# Patient Record
Sex: FEMALE | Race: OTHER | HISPANIC OR LATINO | URBAN - METROPOLITAN AREA
[De-identification: names, ages, dates, MRNs, and addresses within clinical notes are randomized per-mention and may not be internally consistent; named-entity substitution may affect disease eponyms.]

---

## 2019-12-26 ENCOUNTER — EMERGENCY (EMERGENCY)
Facility: HOSPITAL | Age: 39
LOS: 1 days | Discharge: HOME | End: 2019-12-26
Attending: EMERGENCY MEDICINE | Admitting: EMERGENCY MEDICINE
Payer: COMMERCIAL

## 2019-12-26 VITALS
TEMPERATURE: 99 F | HEART RATE: 61 BPM | DIASTOLIC BLOOD PRESSURE: 83 MMHG | RESPIRATION RATE: 16 BRPM | SYSTOLIC BLOOD PRESSURE: 144 MMHG | OXYGEN SATURATION: 100 %

## 2019-12-26 VITALS
RESPIRATION RATE: 18 BRPM | TEMPERATURE: 99 F | SYSTOLIC BLOOD PRESSURE: 147 MMHG | HEART RATE: 65 BPM | OXYGEN SATURATION: 100 % | DIASTOLIC BLOOD PRESSURE: 87 MMHG

## 2019-12-26 LAB
ALBUMIN SERPL ELPH-MCNC: 3.7 G/DL — SIGNIFICANT CHANGE UP (ref 3.3–5)
ALBUMIN SERPL ELPH-MCNC: 3.9 G/DL — SIGNIFICANT CHANGE UP (ref 3.3–5)
ALP SERPL-CCNC: 77 U/L — SIGNIFICANT CHANGE UP (ref 40–120)
ALP SERPL-CCNC: 77 U/L — SIGNIFICANT CHANGE UP (ref 40–120)
ALT FLD-CCNC: 10 U/L — SIGNIFICANT CHANGE UP (ref 4–33)
ALT FLD-CCNC: 11 U/L — SIGNIFICANT CHANGE UP (ref 4–33)
ANION GAP SERPL CALC-SCNC: 10 MMO/L — SIGNIFICANT CHANGE UP (ref 7–14)
ANION GAP SERPL CALC-SCNC: 14 MMO/L — SIGNIFICANT CHANGE UP (ref 7–14)
APPEARANCE UR: CLEAR — SIGNIFICANT CHANGE UP
AST SERPL-CCNC: 12 U/L — SIGNIFICANT CHANGE UP (ref 4–32)
AST SERPL-CCNC: 13 U/L — SIGNIFICANT CHANGE UP (ref 4–32)
BASOPHILS # BLD AUTO: 0.02 K/UL — SIGNIFICANT CHANGE UP (ref 0–0.2)
BASOPHILS NFR BLD AUTO: 0.4 % — SIGNIFICANT CHANGE UP (ref 0–2)
BILIRUB SERPL-MCNC: 0.2 MG/DL — SIGNIFICANT CHANGE UP (ref 0.2–1.2)
BILIRUB SERPL-MCNC: < 0.2 MG/DL — LOW (ref 0.2–1.2)
BILIRUB UR-MCNC: NEGATIVE — SIGNIFICANT CHANGE UP
BLD GP AB SCN SERPL QL: NEGATIVE — SIGNIFICANT CHANGE UP
BLOOD UR QL VISUAL: NEGATIVE — SIGNIFICANT CHANGE UP
BUN SERPL-MCNC: 14 MG/DL — SIGNIFICANT CHANGE UP (ref 7–23)
BUN SERPL-MCNC: 15 MG/DL — SIGNIFICANT CHANGE UP (ref 7–23)
CALCIUM SERPL-MCNC: 8.5 MG/DL — SIGNIFICANT CHANGE UP (ref 8.4–10.5)
CALCIUM SERPL-MCNC: 9 MG/DL — SIGNIFICANT CHANGE UP (ref 8.4–10.5)
CHLORIDE SERPL-SCNC: 102 MMOL/L — SIGNIFICANT CHANGE UP (ref 98–107)
CHLORIDE SERPL-SCNC: 102 MMOL/L — SIGNIFICANT CHANGE UP (ref 98–107)
CO2 SERPL-SCNC: 21 MMOL/L — LOW (ref 22–31)
CO2 SERPL-SCNC: 25 MMOL/L — SIGNIFICANT CHANGE UP (ref 22–31)
COLOR SPEC: SIGNIFICANT CHANGE UP
CREAT SERPL-MCNC: 0.49 MG/DL — LOW (ref 0.5–1.3)
CREAT SERPL-MCNC: 0.49 MG/DL — LOW (ref 0.5–1.3)
EOSINOPHIL # BLD AUTO: 0.07 K/UL — SIGNIFICANT CHANGE UP (ref 0–0.5)
EOSINOPHIL NFR BLD AUTO: 1.3 % — SIGNIFICANT CHANGE UP (ref 0–6)
GLUCOSE SERPL-MCNC: 87 MG/DL — SIGNIFICANT CHANGE UP (ref 70–99)
GLUCOSE SERPL-MCNC: 97 MG/DL — SIGNIFICANT CHANGE UP (ref 70–99)
GLUCOSE UR-MCNC: NEGATIVE — SIGNIFICANT CHANGE UP
HCG SERPL-ACNC: < 5 MIU/ML — SIGNIFICANT CHANGE UP
HCG SERPL-ACNC: < 5 MIU/ML — SIGNIFICANT CHANGE UP
HCT VFR BLD CALC: 36.5 % — SIGNIFICANT CHANGE UP (ref 34.5–45)
HGB BLD-MCNC: 11.1 G/DL — LOW (ref 11.5–15.5)
IMM GRANULOCYTES NFR BLD AUTO: 0.2 % — SIGNIFICANT CHANGE UP (ref 0–1.5)
KETONES UR-MCNC: NEGATIVE — SIGNIFICANT CHANGE UP
LEUKOCYTE ESTERASE UR-ACNC: NEGATIVE — SIGNIFICANT CHANGE UP
LIDOCAIN IGE QN: 16.1 U/L — SIGNIFICANT CHANGE UP (ref 7–60)
LYMPHOCYTES # BLD AUTO: 1.81 K/UL — SIGNIFICANT CHANGE UP (ref 1–3.3)
LYMPHOCYTES # BLD AUTO: 32.5 % — SIGNIFICANT CHANGE UP (ref 13–44)
MCHC RBC-ENTMCNC: 23.7 PG — LOW (ref 27–34)
MCHC RBC-ENTMCNC: 30.4 % — LOW (ref 32–36)
MCV RBC AUTO: 78 FL — LOW (ref 80–100)
MONOCYTES # BLD AUTO: 0.38 K/UL — SIGNIFICANT CHANGE UP (ref 0–0.9)
MONOCYTES NFR BLD AUTO: 6.8 % — SIGNIFICANT CHANGE UP (ref 2–14)
NEUTROPHILS # BLD AUTO: 3.28 K/UL — SIGNIFICANT CHANGE UP (ref 1.8–7.4)
NEUTROPHILS NFR BLD AUTO: 58.8 % — SIGNIFICANT CHANGE UP (ref 43–77)
NITRITE UR-MCNC: NEGATIVE — SIGNIFICANT CHANGE UP
NRBC # FLD: 0 K/UL — SIGNIFICANT CHANGE UP (ref 0–0)
PH UR: 7.5 — SIGNIFICANT CHANGE UP (ref 5–8)
PLATELET # BLD AUTO: 233 K/UL — SIGNIFICANT CHANGE UP (ref 150–400)
PMV BLD: 11.3 FL — SIGNIFICANT CHANGE UP (ref 7–13)
POTASSIUM SERPL-MCNC: 4 MMOL/L — SIGNIFICANT CHANGE UP (ref 3.5–5.3)
POTASSIUM SERPL-MCNC: 4.1 MMOL/L — SIGNIFICANT CHANGE UP (ref 3.5–5.3)
POTASSIUM SERPL-SCNC: 4 MMOL/L — SIGNIFICANT CHANGE UP (ref 3.5–5.3)
POTASSIUM SERPL-SCNC: 4.1 MMOL/L — SIGNIFICANT CHANGE UP (ref 3.5–5.3)
PROT SERPL-MCNC: 6.6 G/DL — SIGNIFICANT CHANGE UP (ref 6–8.3)
PROT SERPL-MCNC: 7.2 G/DL — SIGNIFICANT CHANGE UP (ref 6–8.3)
PROT UR-MCNC: 10 — SIGNIFICANT CHANGE UP
RBC # BLD: 4.68 M/UL — SIGNIFICANT CHANGE UP (ref 3.8–5.2)
RBC # FLD: 17.6 % — HIGH (ref 10.3–14.5)
RH IG SCN BLD-IMP: POSITIVE — SIGNIFICANT CHANGE UP
RH IG SCN BLD-IMP: POSITIVE — SIGNIFICANT CHANGE UP
SODIUM SERPL-SCNC: 137 MMOL/L — SIGNIFICANT CHANGE UP (ref 135–145)
SODIUM SERPL-SCNC: 137 MMOL/L — SIGNIFICANT CHANGE UP (ref 135–145)
SP GR SPEC: 1.02 — SIGNIFICANT CHANGE UP (ref 1–1.04)
TSH SERPL-MCNC: 1.55 UIU/ML — SIGNIFICANT CHANGE UP (ref 0.27–4.2)
UROBILINOGEN FLD QL: NORMAL — SIGNIFICANT CHANGE UP
WBC # BLD: 5.57 K/UL — SIGNIFICANT CHANGE UP (ref 3.8–10.5)
WBC # FLD AUTO: 5.57 K/UL — SIGNIFICANT CHANGE UP (ref 3.8–10.5)

## 2019-12-26 PROCEDURE — 99285 EMERGENCY DEPT VISIT HI MDM: CPT

## 2019-12-26 PROCEDURE — 74177 CT ABD & PELVIS W/CONTRAST: CPT | Mod: 26

## 2019-12-26 RX ORDER — MORPHINE SULFATE 50 MG/1
4 CAPSULE, EXTENDED RELEASE ORAL ONCE
Refills: 0 | Status: DISCONTINUED | OUTPATIENT
Start: 2019-12-26 | End: 2019-12-26

## 2019-12-26 RX ADMIN — MORPHINE SULFATE 4 MILLIGRAM(S): 50 CAPSULE, EXTENDED RELEASE ORAL at 14:50

## 2019-12-26 RX ADMIN — MORPHINE SULFATE 4 MILLIGRAM(S): 50 CAPSULE, EXTENDED RELEASE ORAL at 15:05

## 2019-12-26 NOTE — ED PROVIDER NOTE - PHYSICAL EXAMINATION
General: Alert and Oriented. No apparent distress.  Head: Normocephalic and atraumatic.  Eyes: PERRLA with EOMI.  Neck: Supple. Trachea midline.   Cardiac: Normal S1 and S2 w/ RRR. No murmurs appreciated.   Pulmonary: Vesicular breath sounds bilaterally. No increased WOB. No wheezes or crackles.  Abdominal: Soft, mild LUQ tenderness. +L CVA T  Neurologic: No focal sensory or motor deficits.  Musculoskeletal: Strength appropriate in all 4 extremities for age with no limited ROM.  Skin: Color appropriate for race. Intact, warm, and well-perfused.

## 2019-12-26 NOTE — ED PROVIDER NOTE - CLINICAL SUMMARY MEDICAL DECISION MAKING FREE TEXT BOX
Pt p/w abd pain/flank pain in setting of gastric bypass may be due to kidney stone vs MSK pain vs UTI however will need CT scan to evlaute for possible surgical-related complications; non toxic on exam low suspicion for acute imminently threatening disease. Marco att: Pt p/w abd pain/flank pain in setting of gastric bypass may be due to kidney stone vs MSK pain vs UTI however will need CT scan to evaluate for possible surgical-related complications; non toxic on exam low suspicion for acute imminently threatening disease.

## 2019-12-26 NOTE — ED PROVIDER NOTE - OBJECTIVE STATEMENT
38 yo F hx gastric bypass 2y ago, c/o LUQ abdominal pain and left sided flank pain. Pain  started 2d ago, was just driving no clear inciting event, 5/10, intermittent. Unsure of paliating or provoking factors. Last BM yesterday; has been passing flatus today, BM and gas have been normal for her.    Denies falls/trauma, n/v, fever, hematuria, dysuria. LMP 10/20, hx tubal ligation.

## 2019-12-26 NOTE — ED PROVIDER NOTE - NSFOLLOWUPINSTRUCTIONS_ED_ALL_ED_FT
Please call or see your doctor or return to the ER if you have new chest pain, shortness of breath, fevers, inability to eat or drink, or worsening of symptoms that brought you to the emergency room today.    Take ibuprofen as needed for pain.     Please follow up with your primary care physician in 1-2 days or as soon as possible. Please call or see your doctor or return to the ER if you have new chest pain, shortness of breath, fevers, inability to eat or drink, or worsening of symptoms that brought you to the emergency room today.    Take ibuprofen as needed for pain. If you have continued abdominal pain please follow up with gastroenterology, phone number above.    Please follow up with your primary care physician in 1-2 days or as soon as possible.

## 2019-12-26 NOTE — ED PROVIDER NOTE - PATIENT PORTAL LINK FT
You can access the FollowMyHealth Patient Portal offered by [facility] by registering at the following website: [email or web address]. By joining [employer]’s FollowMyHealth portal, you will also be able to view your health information using other applications (apps) compatible with our system.

## 2019-12-26 NOTE — ED PROVIDER NOTE - PROGRESS NOTE DETAILS
Pt reports pain is improved; labs and CT unremarkable, this was endorsed to the patient who stated she feels comfortable following up with her PMD. Return precautions discussed w/ pt

## 2019-12-26 NOTE — ED ADULT NURSE NOTE - CAS EDN DISCHARGE INTERVENTIONS
1- Monitor Blood pressure closely.  2- Blood pressure control.  3- BP. meds as per cardiology and primary care team. IV discontinued, cath removed intact/by MD

## 2019-12-26 NOTE — ED ADULT TRIAGE NOTE - CHIEF COMPLAINT QUOTE
Pt with co abdominal pain x 2 days . Pt denies n/v/d. Pt states she is passing gas last BM yesterday. Pt looks pale in triage, HX of anemia and gastric by pass 2 years ago.

## 2019-12-26 NOTE — ED ADULT NURSE NOTE - OBJECTIVE STATEMENT
Pt presenting to room 15 AxOx4 ambulatory at baseline with c/o left sided lower quadrant and left sided flank pain x2 days. PMH of tubal ligation. Pt states she was sent here after going to [facility] MD this AM- sent here for CT. On arrival to ED pt's breathing is even and unlabored, pt satting well on RA. Pt denies CP, SOB, N/V, burning or pain with urination, fevers, chills. Abdomen nondistended. IV established with 20g in LAC, labs drawn and sent, MD at bedside, VSS and as noted, will continue to monitor.

## 2019-12-27 LAB
BACTERIA UR CULT: SIGNIFICANT CHANGE UP
SPECIMEN SOURCE: SIGNIFICANT CHANGE UP

## 2020-02-11 ENCOUNTER — EMERGENCY (EMERGENCY)
Facility: HOSPITAL | Age: 40
LOS: 1 days | Discharge: HOME | End: 2020-02-11
Attending: EMERGENCY MEDICINE | Admitting: STUDENT IN AN ORGANIZED HEALTH CARE EDUCATION/TRAINING PROGRAM
Payer: COMMERCIAL

## 2020-02-11 VITALS
SYSTOLIC BLOOD PRESSURE: 137 MMHG | OXYGEN SATURATION: 98 % | RESPIRATION RATE: 16 BRPM | HEART RATE: 96 BPM | DIASTOLIC BLOOD PRESSURE: 80 MMHG | TEMPERATURE: 99 F

## 2020-02-11 VITALS
RESPIRATION RATE: 16 BRPM | DIASTOLIC BLOOD PRESSURE: 84 MMHG | TEMPERATURE: 98 F | SYSTOLIC BLOOD PRESSURE: 161 MMHG | OXYGEN SATURATION: 97 % | HEART RATE: 92 BPM

## 2020-02-11 LAB
ALBUMIN SERPL ELPH-MCNC: 3.9 G/DL — SIGNIFICANT CHANGE UP (ref 3.3–5)
ALP SERPL-CCNC: 88 U/L — SIGNIFICANT CHANGE UP (ref 40–120)
ALT FLD-CCNC: 16 U/L — SIGNIFICANT CHANGE UP (ref 4–33)
ANION GAP SERPL CALC-SCNC: 12 MMO/L — SIGNIFICANT CHANGE UP (ref 7–14)
AST SERPL-CCNC: 16 U/L — SIGNIFICANT CHANGE UP (ref 4–32)
BASOPHILS # BLD AUTO: 0.02 K/UL — SIGNIFICANT CHANGE UP (ref 0–0.2)
BASOPHILS NFR BLD AUTO: 0.4 % — SIGNIFICANT CHANGE UP (ref 0–2)
BILIRUB SERPL-MCNC: < 0.2 MG/DL — LOW (ref 0.2–1.2)
BUN SERPL-MCNC: 10 MG/DL — SIGNIFICANT CHANGE UP (ref 7–23)
CALCIUM SERPL-MCNC: 8.7 MG/DL — SIGNIFICANT CHANGE UP (ref 8.4–10.5)
CHLORIDE SERPL-SCNC: 102 MMOL/L — SIGNIFICANT CHANGE UP (ref 98–107)
CO2 SERPL-SCNC: 23 MMOL/L — SIGNIFICANT CHANGE UP (ref 22–31)
CREAT SERPL-MCNC: 0.45 MG/DL — LOW (ref 0.5–1.3)
EOSINOPHIL # BLD AUTO: 0.03 K/UL — SIGNIFICANT CHANGE UP (ref 0–0.5)
EOSINOPHIL NFR BLD AUTO: 0.5 % — SIGNIFICANT CHANGE UP (ref 0–6)
GLUCOSE SERPL-MCNC: 176 MG/DL — HIGH (ref 70–99)
HCT VFR BLD CALC: 36 % — SIGNIFICANT CHANGE UP (ref 34.5–45)
HGB BLD-MCNC: 11 G/DL — LOW (ref 11.5–15.5)
IMM GRANULOCYTES NFR BLD AUTO: 0.7 % — SIGNIFICANT CHANGE UP (ref 0–1.5)
LYMPHOCYTES # BLD AUTO: 0.64 K/UL — LOW (ref 1–3.3)
LYMPHOCYTES # BLD AUTO: 11.5 % — LOW (ref 13–44)
MCHC RBC-ENTMCNC: 25.3 PG — LOW (ref 27–34)
MCHC RBC-ENTMCNC: 30.6 % — LOW (ref 32–36)
MCV RBC AUTO: 82.9 FL — SIGNIFICANT CHANGE UP (ref 80–100)
MONOCYTES # BLD AUTO: 0.37 K/UL — SIGNIFICANT CHANGE UP (ref 0–0.9)
MONOCYTES NFR BLD AUTO: 6.6 % — SIGNIFICANT CHANGE UP (ref 2–14)
NEUTROPHILS # BLD AUTO: 4.47 K/UL — SIGNIFICANT CHANGE UP (ref 1.8–7.4)
NEUTROPHILS NFR BLD AUTO: 80.3 % — HIGH (ref 43–77)
NRBC # FLD: 0 K/UL — SIGNIFICANT CHANGE UP (ref 0–0)
PLATELET # BLD AUTO: 202 K/UL — SIGNIFICANT CHANGE UP (ref 150–400)
PMV BLD: 11.2 FL — SIGNIFICANT CHANGE UP (ref 7–13)
POTASSIUM SERPL-MCNC: 3.7 MMOL/L — SIGNIFICANT CHANGE UP (ref 3.5–5.3)
POTASSIUM SERPL-SCNC: 3.7 MMOL/L — SIGNIFICANT CHANGE UP (ref 3.5–5.3)
PROT SERPL-MCNC: 7.3 G/DL — SIGNIFICANT CHANGE UP (ref 6–8.3)
RBC # BLD: 4.34 M/UL — SIGNIFICANT CHANGE UP (ref 3.8–5.2)
RBC # FLD: 17 % — HIGH (ref 10.3–14.5)
SODIUM SERPL-SCNC: 137 MMOL/L — SIGNIFICANT CHANGE UP (ref 135–145)
WBC # BLD: 5.57 K/UL — SIGNIFICANT CHANGE UP (ref 3.8–10.5)
WBC # FLD AUTO: 5.57 K/UL — SIGNIFICANT CHANGE UP (ref 3.8–10.5)

## 2020-02-11 PROCEDURE — 71046 X-RAY EXAM CHEST 2 VIEWS: CPT | Mod: 26

## 2020-02-11 PROCEDURE — 99283 EMERGENCY DEPT VISIT LOW MDM: CPT

## 2020-02-11 RX ORDER — ALBUTEROL 90 UG/1
3 AEROSOL, METERED ORAL
Qty: 60 | Refills: 0
Start: 2020-02-11 | End: 2020-03-11

## 2020-02-11 RX ORDER — ACETAMINOPHEN 500 MG
650 TABLET ORAL ONCE
Refills: 0 | Status: COMPLETED | OUTPATIENT
Start: 2020-02-11 | End: 2020-02-11

## 2020-02-11 RX ADMIN — Medication 650 MILLIGRAM(S): at 09:12

## 2020-02-11 RX ADMIN — Medication 50 MILLIGRAM(S): at 09:12

## 2020-02-11 NOTE — ED PROVIDER NOTE - PHYSICAL EXAMINATION
GEN: no acute respiratory distress. nontoxic, speaking comfortably in full sentences, ambulating with steady gait.  HEENT: NCAT. face symmetrical. PERRL 4mm, EOMI, nose midline and without discharge,  MMM, oropharynx wnl.  Neck: no JVD, trachea midline, no LAD, FROM, no stiffness  CV: RRR. +S1S2, no murmur. 2+ pulses in 4 extremities  Chest: CTA B/l. no wheezing, rales, rhonchi. no retractions. good air movement. no tenderness. no rash or ecchymosis  ABD: +BS, soft, non distended, non tender. No guarding/rebound.   MSK: No clubbing, cyanosis, edema. FROM of all extremities. no tenderness to palpation. No midline or paraspinal tenderness.  Neuro: AAOX3. Sensation intact, motor 5/5 throughout.  SKIN: No erythema, lesions or rash

## 2020-02-11 NOTE — ED PROVIDER NOTE - CLINICAL SUMMARY MEDICAL DECISION MAKING FREE TEXT BOX
fever, chills, wheezing, cough. + sick contact. suspect flu-like illness. xr neg. labs without significant abnormality. glucose elevated will follow up with pcp. Return precautions were discussed with patient at bedside and patient expressed understanding. stable for dc. Return precautions were discussed with patient at bedside and patient expressed understanding.

## 2020-02-11 NOTE — ED PROVIDER NOTE - RAPID ASSESSMENT
40 y/o F with PMhx of asthma presents to ED c/o SOB, Yancey, CP, cough, and congestion. Pt was seen by Slade yesterday and told it was a cough. Pt notes it has gotten worse since yesterday. Pt has been taking Motrin 800mg,  last dose was 3hrs ago. Pt has sick contact at home. .Pt denies abd pain. 40 y/o F with PMhx of asthma presents to ED c/o SOB, Yancey, CP, cough, and congestion. Pt was seen by Slade yesterday and told it was a cough. Pt notes it has gotten worse since yesterday. Pt has been taking Motrin 800mg,  last dose was 3hrs ago. Pt has sick contact at home. .Pt denies abdominal pain.

## 2020-02-11 NOTE — ED ADULT NURSE NOTE - OBJECTIVE STATEMENT
Intake RN: Patient is a 40 y/o F reporting fevers/chills, generalized myalgias, dry cough and head ache that began Sunday.  Respirations unlabored, however patient reported seeking care as she began to develop shortness of breath.  20 gauge intravenous line placed in right forearm.

## 2020-02-11 NOTE — ED PROVIDER NOTE - OBJECTIVE STATEMENT
40 yo F past medical history intermittent asthma, hypertension presents for 3 days of shortness of breathe, cough without sputum, wheezing, tactile fever, chills for past 3 days. no recent travel or illness. +sick contacts-kids. no ha, neck pain, abdominal pain, urinary complaints.

## 2020-02-11 NOTE — ED PROVIDER NOTE - NSFOLLOWUPINSTRUCTIONS_ED_ALL_ED_FT
1) Please follow-up with your primary care doctor within the next 3-5 days. Please call today for an appointment.   2) If you have any worsening of symptoms or any other concerns please return to the ED immediately.  3) Please take the medication you were prescribed today and continue taking your home medications as directed.  4) You may have been given a copy of your labs and/or imaging.  Please go over these with your primary care doctor.

## 2020-02-11 NOTE — ED ADULT TRIAGE NOTE - CHIEF COMPLAINT QUOTE
alert oriented  c/o subjective chills fever fatigue cough chest pain  headcahe body aches  started 2 days ago  cough became much worse last night   hx HTN

## 2020-02-11 NOTE — ED PROVIDER NOTE - PATIENT PORTAL LINK FT
You can access the FollowMyHealth Patient Portal offered by [facility] by registering at the following website: [email or web address]. By joining [employer]’s FollowMyHealth portal, you will also be able to view your health information using other applications (apps) compatible with our system.

## 2020-02-11 NOTE — ED PROVIDER NOTE - NS ED ROS FT
Constitutional: + fever. + chills.   Eyes: No visual changes, eye pain or redness  HEENT: No throat pain, hearing changes, ear pain, nasal pain. No nose bleeding.  CV: No chest pain, no palpitations  Resp: + shortness of breath, + cough  GI: No abdominal pain. No nausea. no  vomiting. No diarrhea. No constipation.   : No dysuria, hematuria. no urinary frequency, no urinary urgency.  MSK: No musculoskeletal pain  Skin: No rash, lesions, no bruises  Neuro: No headache. No numbness or tingling. No weakness. No dizziness.  Heme: no easy bruising.  Allergy/Immunology: no allergy to medicine or food. Constitutional: + fever. + chills.   Eyes: No visual changes, eye pain or redness  HEENT: No throat pain, hearing changes, ear pain, nasal pain. No nose bleeding.  CV: No chest pain, no palpitations  Resp: + shortness of breath, + cough  GI: No abdominal pain. No nausea. no  vomiting. No diarrhea. No constipation.   : No dysuria, hematuria. no urinary frequency, no urinary urgency.  MSK: No musculoskeletal pain  Skin: No rash, lesions, no bruises  Neuro: No headache. No numbness or tingling. No weakness. No dizziness.  Allergy/Immunology: allergy to ambien and augmentin

## 2021-02-15 NOTE — ED ADULT NURSE NOTE - NSSUHOSCREENINGYN_ED_ALL_ED
Yes - the patient is able to be screened
[place] to call system/Call bell, personal items and telephone within reach/Instruct patient to call for assistance/Room bathroom lighting operational/Non-slip footwear when patient is off stretcher/Physically safe environment: no spills, clutter or unnecessary equipment/Stretcher in lowest position, wheels locked, appropriate side rails in place/Monitor gait and stability/Monitor for mental status changes and reorient to person, place, and time/Review medications for side effects contributing to fall risk/Reinforce activity limits and safety measures with patient and family

## 2021-07-26 ENCOUNTER — TELEPHONE (OUTPATIENT)
Dept: NEUROLOGY | Facility: CLINIC | Age: 41
End: 2021-07-26

## 2021-07-26 NOTE — TELEPHONE ENCOUNTER
Best contact number for patient:  [phone]  Emergency Contact name and number:  None  Referring provider and telephone number:  Referral is being sent by patient's dr  Primary Care Provider Name and if affiliated with [facility]:   Dr Heriberto Brewster No   Reason for Appointment/Dx:  Memory issues  Have you seen and followed up with a pediatric Neurologist for this disease in the past?      No (If yes ok to schedule with Dr Leora Krause)    Neurology Location patient would like to be seen:    Order received? No                                                 Records Received? No  If no, explain: records are being sent    Have you ever seen another Neurologist?       No    Insurance [address] [place] [address]     ID/Policy #:[id number]    Secondary Insurance:    ID/Policy#: Workman's Comp/ Accident/ School  Information      Workman's Comp/Accident/School related?        No    If yes name of Insurance company:    Claim #:    Date of Injury:    Type of Injury:     Name and Telephone Number:    Notes:                   Appointment date:   12/10/2021

## 2021-12-06 ENCOUNTER — TELEPHONE (OUTPATIENT)
Dept: NEUROLOGY | Facility: CLINIC | Age: 41
End: 2021-12-06

## 2021-12-10 ENCOUNTER — CONSULT (OUTPATIENT)
Dept: NEUROLOGY | Facility: CLINIC | Age: 41
End: 2021-12-10
Payer: COMMERCIAL

## 2021-12-10 VITALS
HEART RATE: 68 BPM | BODY MASS INDEX: 37.98 KG/M2 | DIASTOLIC BLOOD PRESSURE: 70 MMHG | HEIGHT: 67 IN | TEMPERATURE: 97.7 F | SYSTOLIC BLOOD PRESSURE: 126 MMHG | WEIGHT: 242 LBS

## 2021-12-10 DIAGNOSIS — R41.3 AMNESIA/MEMORY DISORDER: ICD-10-CM

## 2021-12-10 PROCEDURE — 99244 OFF/OP CNSLTJ NEW/EST MOD 40: CPT | Performed by: PSYCHIATRY & NEUROLOGY

## 2021-12-10 RX ORDER — LOSARTAN POTASSIUM 100 MG/1
100 TABLET ORAL DAILY
COMMUNITY
Start: 2021-07-30 | End: 2022-07-30

## 2021-12-10 RX ORDER — ATORVASTATIN CALCIUM 20 MG/1
TABLET, FILM COATED ORAL
COMMUNITY
Start: 2021-10-04 | End: 2022-05-18 | Stop reason: SDUPTHER

## 2021-12-10 RX ORDER — FERROUS SULFATE 325(65) MG
325 TABLET ORAL
COMMUNITY

## 2021-12-10 RX ORDER — ALBUTEROL SULFATE 90 UG/1
2 AEROSOL, METERED RESPIRATORY (INHALATION) EVERY 6 HOURS PRN
COMMUNITY
Start: 2021-07-06 | End: 2022-07-06

## 2021-12-10 RX ORDER — IBUPROFEN 800 MG/1
TABLET ORAL
COMMUNITY
Start: 2021-11-04

## 2021-12-10 RX ORDER — ATORVASTATIN CALCIUM 20 MG/1
20 TABLET, FILM COATED ORAL DAILY
COMMUNITY
Start: 2021-07-13 | End: 2022-07-13

## 2021-12-21 ENCOUNTER — APPOINTMENT (OUTPATIENT)
Dept: LAB | Facility: CLINIC | Age: 41
End: 2021-12-21
Payer: COMMERCIAL

## 2021-12-21 DIAGNOSIS — R41.3 AMNESIA/MEMORY DISORDER: ICD-10-CM

## 2021-12-21 PROCEDURE — 86618 LYME DISEASE ANTIBODY: CPT

## 2021-12-21 PROCEDURE — 36415 COLL VENOUS BLD VENIPUNCTURE: CPT

## 2021-12-21 PROCEDURE — 86592 SYPHILIS TEST NON-TREP QUAL: CPT

## 2021-12-22 LAB
B BURGDOR IGG+IGM SER-ACNC: 26
RPR SER QL: NORMAL

## 2022-01-06 ENCOUNTER — HOSPITAL ENCOUNTER (OUTPATIENT)
Dept: NEUROLOGY | Facility: HOSPITAL | Age: 42
Discharge: HOME | End: 2022-01-06
Attending: PSYCHIATRY & NEUROLOGY
Payer: COMMERCIAL

## 2022-01-06 DIAGNOSIS — R41.3 AMNESIA/MEMORY DISORDER: ICD-10-CM

## 2022-01-06 PROCEDURE — 95819 EEG AWAKE AND ASLEEP: CPT | Performed by: PSYCHIATRY & NEUROLOGY

## 2022-01-06 PROCEDURE — 95816 EEG AWAKE AND DROWSY: CPT

## 2022-01-27 ENCOUNTER — HOSPITAL ENCOUNTER (OUTPATIENT)
Dept: MRI IMAGING | Facility: CLINIC | Age: 42
Discharge: HOME | End: 2022-01-27
Attending: PSYCHIATRY & NEUROLOGY
Payer: COMMERCIAL

## 2022-01-27 DIAGNOSIS — R41.3 AMNESIA/MEMORY DISORDER: ICD-10-CM

## 2022-01-27 PROCEDURE — G1004 CDSM NDSC: HCPCS

## 2022-01-27 PROCEDURE — 70551 MRI BRAIN STEM W/O DYE: CPT

## 2022-02-01 ENCOUNTER — TELEPHONE (OUTPATIENT)
Dept: NEUROLOGY | Facility: CLINIC | Age: 42
End: 2022-02-01

## 2022-02-01 NOTE — TELEPHONE ENCOUNTER
Spoke with patient in detail regarding results of brain MRI which demonstrated a small arachnoid cyst in left middle fossa area which would be asymptomatic and most likely congenital in nature  The nonspecific white matter changes are most likely reflection of her underlying diabetes, hypertension and high cholesterol  Her EEG was normal   Will order neuropsych testing as she continues to have concerns about her memory    She also reports she has been getting headaches and will plan to discuss this further at the time of her follow-up appointment

## 2022-02-03 DIAGNOSIS — R41.3 AMNESIA/MEMORY DISORDER: ICD-10-CM

## 2022-02-03 DIAGNOSIS — F41.9 ANXIETY DISORDER: Primary | ICD-10-CM

## 2022-02-04 ENCOUNTER — TELEPHONE (OUTPATIENT)
Dept: NEUROLOGY | Facility: CLINIC | Age: 42
End: 2022-02-04

## 2022-02-04 NOTE — TELEPHONE ENCOUNTER
Called patient to let her know we do not participate with insurance plan   Letter generated and mailed to patient as well as message sent to referring provider

## 2022-02-07 ENCOUNTER — APPOINTMENT (OUTPATIENT)
Dept: LAB | Facility: CLINIC | Age: 42
End: 2022-02-07
Payer: COMMERCIAL

## 2022-02-07 DIAGNOSIS — B35.1 MYCOTIC TOENAILS: ICD-10-CM

## 2022-02-07 LAB
ALBUMIN SERPL BCP-MCNC: 3.9 G/DL (ref 3.5–5)
ALP SERPL-CCNC: 88 U/L (ref 46–116)
ALT SERPL W P-5'-P-CCNC: 36 U/L (ref 12–78)
AST SERPL W P-5'-P-CCNC: 17 U/L (ref 5–45)
BILIRUB DIRECT SERPL-MCNC: 0.06 MG/DL (ref 0–0.2)
BILIRUB SERPL-MCNC: 0.4 MG/DL (ref 0.2–1)
PROT SERPL-MCNC: 7.5 G/DL (ref 6.4–8.2)

## 2022-02-07 PROCEDURE — 36415 COLL VENOUS BLD VENIPUNCTURE: CPT

## 2022-02-07 PROCEDURE — 80076 HEPATIC FUNCTION PANEL: CPT

## 2022-04-19 DIAGNOSIS — R41.3 AMNESIA/MEMORY DISORDER: Primary | ICD-10-CM

## 2022-05-03 ENCOUNTER — TELEPHONE (OUTPATIENT)
Dept: NEUROLOGY | Facility: CLINIC | Age: 42
End: 2022-05-03

## 2022-05-03 DIAGNOSIS — R41.3 AMNESIA/MEMORY DISORDER: Primary | ICD-10-CM

## 2022-05-03 NOTE — TELEPHONE ENCOUNTER
pt called and states that she needs an order for out pt ST, for cognitive remediation  we originally sent order for neuropsychology which is incorrect      ST order can be faxed to [id number] at [phone]

## 2022-05-05 ENCOUNTER — TELEPHONE (OUTPATIENT)
Dept: NEUROLOGY | Facility: CLINIC | Age: 42
End: 2022-05-05

## 2022-05-05 NOTE — TELEPHONE ENCOUNTER
Called [facility] and spoke to marylin landa    She states that she received script that was faxed today and nothing else is needed from our office at this time

## 2022-05-05 NOTE — TELEPHONE ENCOUNTER
[facility] called spoke with Alvaro Desouza she was looking for script for this Patient  I sent the fax of the order to [phone] she was asking how the doctor wanted the patient to be treated   I am not able to answer that question/  I told  her that I would forward the information and the contact number [phone]

## 2022-05-11 ENCOUNTER — TELEPHONE (OUTPATIENT)
Dept: NEUROLOGY | Facility: CLINIC | Age: 42
End: 2022-05-11

## 2022-05-18 ENCOUNTER — OFFICE VISIT (OUTPATIENT)
Dept: NEUROLOGY | Facility: CLINIC | Age: 42
End: 2022-05-18
Payer: COMMERCIAL

## 2022-05-18 VITALS
HEART RATE: 66 BPM | HEIGHT: 67 IN | WEIGHT: 238 LBS | BODY MASS INDEX: 37.35 KG/M2 | SYSTOLIC BLOOD PRESSURE: 132 MMHG | DIASTOLIC BLOOD PRESSURE: 80 MMHG | RESPIRATION RATE: 16 BRPM

## 2022-05-18 DIAGNOSIS — R41.3 AMNESIA/MEMORY DISORDER: Primary | ICD-10-CM

## 2022-05-18 DIAGNOSIS — F43.10 PTSD (POST-TRAUMATIC STRESS DISORDER): ICD-10-CM

## 2022-05-18 DIAGNOSIS — F41.9 ANXIETY DISORDER: ICD-10-CM

## 2022-05-18 PROCEDURE — 99214 OFFICE O/P EST MOD 30 MIN: CPT | Performed by: PSYCHIATRY & NEUROLOGY

## 2022-05-18 RX ORDER — SERTRALINE HYDROCHLORIDE 100 MG/1
100 TABLET, FILM COATED ORAL DAILY
COMMUNITY
Start: 2022-04-01
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
## 2022-09-21 ENCOUNTER — TELEPHONE (OUTPATIENT)
Dept: NEUROLOGY | Facility: CLINIC | Age: 42
End: 2022-09-21

## 2022-09-21 NOTE — TELEPHONE ENCOUNTER
Pt left VM,state that she has been having worsening headache,asking for some medication to help  Stated that her next OV is on 2/13/23 and she is on waiting list   Requesting a call back at [phone]    Called pt back to get more information at [phone],KNT got message to call back lather with no option to leave a message  Called home # [phone] listed on communication consent and left message that we been trying to reach her to get more information about her headaches,requesting a call back

## 2022-09-21 NOTE — TELEPHONE ENCOUNTER
Patient called to see if she can get a sooner appointment then Feb 2023 because her migraines are getting worst and unbearable  I advised her I will reach out to Dr Stuart Flores MA to see what I can do and call her back and she said ok

## 2022-09-21 NOTE — TELEPHONE ENCOUNTER
[12:34 PM] Neftaly Wood can i offer this patient today at 3 pm with Papi Friday      [1:01 PM] Alfornia Fleischer  yes sorry! was with a patient      [1:01 PM] Brandi Casas  No worries, thank you!         __________________________________    Genet Lindsay patient and received no answer and not able to leave a message  I sent her a [employer] message to call back to schedule for the 3 pm today with Dr Turpin Friday

## 2022-09-22 NOTE — TELEPHONE ENCOUNTER
Called pt again to get more information about her headaches  Pt explains that this is the same tape of HA that she was experience when she saw dr Sony Sue in 5/18/22  Stated that she has been having daily headaches  Same days gets worse than others,  Stated that has  been going on for a couple of months  Location/Description: throbbing pain, on her R side going to her neck  Pain scale: 5  Associated symptoms:nausea at times, photophobia, vertigo at times  Precipitating factors: light,loud noise     Alleviating factors: staying in the dark and quiet place    What medications have you tried for this  headache?  Excedrin OTC is effective some time, Tylenol,Motrin  Educated pt not to take any OTC pain medication more than 3 times weekly due to rebound HA,she verbalized understanding  Current medication:   Pristig 50 mg po daily  Stratera 25 mg po daily  Valium 2 mg tab 2 tabs po as need it  ,she usually takes it about 1-2 times weekly  Prednisone 20 mg tab takes 3 tabs po daily for 5 days,started on 9/19  Diclofenac 75 mg 1 tab po BID-prescribed by PCP for her back started on 9/7  Losartan 100 mg po daily  Metforman 500 mg po bid   Lipitor 20 mg po every evening  Ferrous Sulfate 325 po daily    Pt went to urgent care this past Monday  Because of back pain,she was given shot of Toradol and script for Prednisone for 5 days  Still having back pain at this time 9/10,stated she has Ortho appt -10/17    Stated that HA didn't got better since started Prednisone      Best [phone],[phone] to leave a detailed message   Please advise

## 2022-09-22 NOTE — TELEPHONE ENCOUNTER
Patient was seen on 2 occasions in the past, the bursts recent in May  Both of these visits were for complaints of memory difficulty  No complaints of headache was voiced at those appointments    Please put her on a move up list

## 2022-11-03 ENCOUNTER — TELEPHONE (OUTPATIENT)
Dept: NEUROLOGY | Facility: CLINIC | Age: 42
End: 2022-11-03

## 2022-11-03 NOTE — TELEPHONE ENCOUNTER
Received fax from [address] [place] [address] containing PT Outpatient Certification/Summary that is requiring Physician Signature

## 2022-11-22 ENCOUNTER — OFFICE VISIT (OUTPATIENT)
Dept: DERMATOLOGY | Facility: CLINIC | Age: 42
End: 2022-11-22

## 2022-11-22 VITALS — WEIGHT: 238 LBS | HEIGHT: 67 IN | BODY MASS INDEX: 37.35 KG/M2 | TEMPERATURE: 97.6 F

## 2022-11-22 DIAGNOSIS — L65.8: Primary | ICD-10-CM

## 2022-11-22 DIAGNOSIS — L21.9 SEBORRHEIC DERMATITIS: ICD-10-CM

## 2022-11-22 DIAGNOSIS — L65.9 HAIR LOSS: ICD-10-CM

## 2022-11-22 RX ORDER — METHOCARBAMOL 750 MG/1
TABLET, FILM COATED ORAL
COMMUNITY
Start: 2022-09-22

## 2022-11-22 RX ORDER — DESVENLAFAXINE 100 MG/1
TABLET, EXTENDED RELEASE ORAL
COMMUNITY
Start: 2022-09-01

## 2022-11-22 RX ORDER — METHYLPHENIDATE HYDROCHLORIDE 5 MG/1
5 TABLET ORAL 2 TIMES DAILY
COMMUNITY
Start: 2022-11-04

## 2022-11-22 RX ORDER — HYDROCHLOROTHIAZIDE 25 MG/1
25 TABLET ORAL DAILY
COMMUNITY
Start: 2022-10-04 | End: 2023-10-14

## 2022-11-22 RX ORDER — DIAZEPAM 2 MG/1
1 TABLET ORAL DAILY PRN
COMMUNITY
Start: 2022-09-07

## 2022-11-22 RX ORDER — DESVENLAFAXINE 50 MG/1
50 TABLET, EXTENDED RELEASE ORAL EVERY MORNING
COMMUNITY
Start: 2022-11-15

## 2022-11-22 RX ORDER — DICLOFENAC SODIUM 75 MG/1
75 TABLET, DELAYED RELEASE ORAL 2 TIMES DAILY WITH MEALS
COMMUNITY
Start: 2022-09-07

## 2022-11-22 RX ORDER — ALPRAZOLAM 0.25 MG/1
1 TABLET ORAL 2 TIMES DAILY PRN
COMMUNITY
Start: 2022-10-04

## 2022-11-22 RX ORDER — DOCUSATE SODIUM 100 MG/1
100 CAPSULE, LIQUID FILLED ORAL 2 TIMES DAILY
COMMUNITY
Start: 2022-11-08

## 2022-11-22 RX ORDER — ALBUTEROL SULFATE 90 UG/1
2 AEROSOL, METERED RESPIRATORY (INHALATION) EVERY 6 HOURS PRN
COMMUNITY
Start: 2022-05-23 | End: 2023-05-23

## 2022-11-22 RX ORDER — BUSPIRONE HYDROCHLORIDE 10 MG/1
TABLET ORAL
COMMUNITY
Start: 2022-11-16

## 2022-11-22 RX ORDER — MELOXICAM 15 MG/1
15 TABLET ORAL DAILY
COMMUNITY
Start: 2022-09-01 | End: 2023-10-14

## 2022-11-22 RX ORDER — IRON,CARBONYL/ASCORBIC ACID 100-250 MG
TABLET ORAL
COMMUNITY
Start: 2015-11-21

## 2022-11-22 NOTE — PATIENT INSTRUCTIONS
1  ALOPECIA- COMBINATION OF TRACTION ALOPECIA, CHRONIC TELOGEN EFFLUVIUM, ANDROGENETIC ALOPECIA      Assessment and Plan:  Based on a thorough discussion of this condition and the management approach to it (including a comprehensive discussion of the known risks, side effects and potential benefits of treatment), the patient (family) agrees to implement the following specific plan:  Ordered Blood work to rule out autoimmune disease or vitamin deficiency: WILLIAN, TSH, T3/T4, testosterone,  Discussed start over the counter Minoxidil (5 %) Apply topically twice a day to scalp  For 6 months   Counseled that this may cause hair loss upon starting medication leading to another telogen effluvium  Discussed treatment options : oral , injectable, and hair transplant  Consider oral minoxidil in future if topical minoxidil is not effective  Can also consider oral finasteride  At this time, oral medications may not be favorable due to extensive medication list portending possible drug interaction  Because hair loss doesn't appear to be scarring at this time, biopsy would not be high yield         How do we treat  alopecia? The main goal of treatment is to slow or stop the progression of hair loss rather than promote hair regrowth  Results are variable and it is not possible to predict who may benefit from treatment  Options for treatment include:   2% minoxidil solution  This medication is applied with a dropper onto dry scale twice daily  Hands should be washed thoroughly to avoid inadvertent application to other parts of the body  It is a pregnancy category C drug and is not recommended for children younger than 25years of age  Side effects include excessive hair growth, especially above the eyebrows and over the cheeks  It usually disappears after a year even with continued use of minoxidil  Tretinoin (Retin-A) applied to the scalp as an adjunct to minoxidil has shown some benefits    Women  Estrogen therapy such as birth control pills that contain the least amount of progestin (Ortho-Cyclen, Ortho Tri-Cyclen, Ovcon 35, Mircette, Demulen, Zovia)  Spironolactone (Aldactone) which is a weak inhibitor of androgen binding to androgen receptors  This is an off-label use of the drug  Men  Finasteride (Proscar) which inhibits the production of dihydrotestosterone  Other options to improve cosmetic appearance can include wearing wigs and hair transplantation  2 SEBORRHEIC DERMATITIS        Assessment and Plan:  Based on a thorough discussion of this condition and the management approach to it (including a comprehensive discussion of the known risks, side effects and potential benefits of treatment), the patient (family) agrees to implement the following specific plan:  No treatment at this moment      Seborrheic Dermatitis   Seborrheic dermatitis is a common, chronic or relapsing form of eczema/dermatitis that mainly affects the sebaceous, gland-rich regions of the scalp, face, and trunk  There are infantile and adult forms of seborrhoeic dermatitis  It is sometimes associated with psoriasis and, in that clinical scenario, may be referred to as "sebo-psoriasis "  Seborrheic dermatitis is also known as "seborrheic eczema "  Dandruff (also called "pityriasis capitis") is an uninflamed form of seborrhoeic dermatitis  Dandruff presents as bran-like scaly patches scattered within hair-bearing areas of the scalp  In an infant, this condition may be referred to as "cradle cap "  The cause of seborrheic dermatitis is not completely understood  It is associated with proliferation of various species of the skin commensal Malassezia, in its yeast (non-pathogenic) form  Its metabolites (such as the fatty acids oleic acid, malssezin, and indole-3-carbaldehyde) may cause an inflammatory reaction  Differences in skin barrier lipid content and function may account for individual presentations      Infantile Seborrheic Dermatitis  Infantile seborrheic dermatitis affects babies under the age of 1 months and usually resolves by 1012 months of age  Infantile seborrheic dermatitis causes "cradle cap" (diffuse, greasy scaling on scalp)  The rash may spread to affect armpit and groin folds (a type of "napkin dermatitis")  There may be associated salmon-pink colored patches that may flake or peel  The rash in this case is usually not especially itchy, so the baby often appears undisturbed by the rash, even when more generalized  Adult Seborrheic Dermatitis  Adult seborrheic dermatitis tends to begin in late adolescence; prevalence is greatest in young adults and in the elderly  It is more common in males than in females  The following factors are sometimes associated with severe adult seborrheic dermatitis:  Oily skin  Familial tendency to seborrhoeic dermatitis or a family history of psoriasis  Immunosuppression: organ transplant recipient, human immunodeficiency virus (HIV) infection and patients with lymphoma  Neurological and psychiatric diseases: Parkinson disease, tardive dyskinesia, depression, epilepsy, facial nerve palsy, spinal cord injury and congenital disorders such as Down syndrome  Treatment for psoriasis with psoralen and ultraviolet A (PUVA) therapy  Lack of sleep  Stressful events  In adults, seborrheic dermatitis may typically affect the scalp, face (creases around the nose, behind ears, within eyebrows) and upper trunk  Typical clinical features include:   Winter flares, improving in summer following sun exposure  Minimal itch most of the time  Combination oily and dry mid-facial skin  Ill-defined localized scaly patches or diffuse scale in the scalp  Blepharitis; scaly red eyelid margins  [place]-pink, thin, scaly, and ill-defined plaques in skin folds on both sides of the face  Petal or ring-shaped flaky patches on hair-line and on anterior chest  Rash in armpits, under the breasts, in the groin folds and genital creases  Superficial folliculitis (inflamed hair follicles) on cheeks and upper trunk    Seborrheic dermatitis is diagnosed by its clinical appearance and behavior  Skin biopsy may be helpful but is rarely necessary to make this diagnosis

## 2022-11-22 NOTE — PROGRESS NOTES
[facility] Note     Patient Name: Shravan Burgess  Encounter Date: 11 22 2022    Have you been cared for by a [place] [id number] Dermatologist in the last 3 years and, if so, which description applies to you? NO  I am considered a "new" patient and must complete all patient intake questions  I am FEMALE/of child-bearing potential     REVIEW OF SYSTEMS:  Have you recently had or currently have any of the following? · Recent fever or chills? No  · Any non-healing wound? No  · Are you pregnant or planning to become pregnant? No  · Are you currently or planning to be nursing or breast feeding? No   PAST MEDICAL HISTORY:  Have you personally ever had or currently have any of the following? If "YES," then please provide more detail  · Skin cancer (such as Melanoma, Basal Cell Carcinoma, Squamous Cell Carcinoma? No  · Tuberculosis, HIV/AIDS, Hepatitis B or C: No  · Systemic Immunosuppression such as Diabetes, Biologic or Immunotherapy, Chemotherapy, Organ Transplantation, Bone Marrow Transplantation YES, diabetes   · Radiation Treatment No   FAMILY HISTORY:  Any "first degree relatives" (parent, brother, sister, or child) with the following? • Skin Cancer, Pancreatic or Other Cancer? No   PATIENT EXPERIENCE:    • Do you want the Dermatologist to perform a COMPLETE skin exam today including a clinical examination under the "bra and underwear" areas? NO  • If necessary, do we have your permission to call and leave a detailed message on your Preferred Phone number that includes your specific medical information?   Yes      Allergies   Allergen Reactions   • Amoxicillin-Pot Clavulanate Itching   • Hydromorphone Abdominal Pain     Red rash ,itchy   • Zolpidem Itching   • Whey - Food Allergy Hives and Rash      Current Outpatient Medications:   •  atorvastatin (LIPITOR) 20 mg tablet, Take 20 mg by mouth daily, Disp: , Rfl:   •  ferrous sulfate 325 (65 Fe) mg tablet, Take 325 mg by mouth daily with breakfast, Disp: , Rfl:   •  ibuprofen (MOTRIN) 800 mg tablet, take 1 tablet by mouth twice a day if needed for mild pain, Disp: , Rfl:   •  losartan (COZAAR) 100 MG tablet, Take 100 mg by mouth daily, Disp: , Rfl:   •  metFORMIN (GLUCOPHAGE) 500 mg tablet, Take 500 mg by mouth in the morning and 500 mg in the evening  Take with meals  , Disp: , Rfl:   •  sertraline (ZOLOFT) 100 mg tablet, Take 100 mg by mouth in the morning , Disp: , Rfl:   •  sertraline (ZOLOFT) 50 mg tablet, Take 50 mg by mouth every evening, Disp: , Rfl:           • Whom besides the patient is providing clinical information about today's encounter?   o NO ADDITIONAL HISTORIAN (patient alone provided history)    Physical Exam and Assessment/Plan by Diagnosis:    1  ALOPECIA- COMBINATION OF TRACTION ALOPECIA, CHRONIC TELOGEN EFFLUVIUM, ANDROGENETIC ALOPECIA    Physical Exam:  • Anatomic Location Affected:  Scalp   • Morphological Description:  Patches of nonscarring hair loss   • Pertinent Positives:  • Pertinent Negatives: Additional History of Present Condition: Patient states had very thin hair  For the past 10 years , during  Last summer started to loss hair but got worse in the last 3 months , she has been taking Iron  And  Keranique  from 2020 -2021 during this period hair loss stopped    Admits emotional  stress event 3 months ago  Assessment and Plan:  Based on a thorough discussion of this condition and the management approach to it (including a comprehensive discussion of the known risks, side effects and potential benefits of treatment), the patient (family) agrees to implement the following specific plan:  • Ordered Blood work to rule out autoimmune disease or vitamin deficiency: WILLIAN, TSH, T3/T4, testosterone,  • Discussed start over the counter Minoxidil (5 %) Apply topically twice a day to scalp  For 6 months    Counseled that this may cause hair loss upon starting medication leading to another telogen effluvium  • Discussed treatment options : oral , injectable, and hair transplant  • Consider oral minoxidil in future if topical minoxidil is not effective  Can also consider oral finasteride  At this time, oral medications may not be favorable due to extensive medication list portending possible drug interaction  • Because hair loss doesn't appear to be scarring at this time, biopsy would not be high yield         How do we treat  alopecia? The main goal of treatment is to slow or stop the progression of hair loss rather than promote hair regrowth  Results are variable and it is not possible to predict who may benefit from treatment  Options for treatment include:   o 2% minoxidil solution  This medication is applied with a dropper onto dry scale twice daily  Hands should be washed thoroughly to avoid inadvertent application to other parts of the body  It is a pregnancy category C drug and is not recommended for children younger than 25years of age    o Side effects include excessive hair growth, especially above the eyebrows and over the cheeks  It usually disappears after a year even with continued use of minoxidil  - Tretinoin (Retin-A) applied to the scalp as an adjunct to minoxidil has shown some benefits   - Women  o Estrogen therapy such as birth control pills that contain the least amount of progestin (Ortho-Cyclen, Ortho Tri-Cyclen, Ovcon 35, Mircette, Demulen, Zovia)  o Spironolactone (Aldactone) which is a weak inhibitor of androgen binding to androgen receptors  This is an off-label use of the drug   - Men  o Finasteride (Proscar) which inhibits the production of dihydrotestosterone  Other options to improve cosmetic appearance can include wearing wigs and hair transplantation  2 SEBORRHEIC DERMATITIS    Physical Exam:  • Anatomic Location Affected:  Scalp   • Morphological Description:  Greasy scaly macules and papules   • Pertinent Positives:  • Pertinent Negatives:     Additional History of Present Condition: Assessment and Plan:  Based on a thorough discussion of this condition and the management approach to it (including a comprehensive discussion of the known risks, side effects and potential benefits of treatment), the patient (family) agrees to implement the following specific plan:  • No treatment at this moment      Seborrheic Dermatitis   Seborrheic dermatitis is a common, chronic or relapsing form of eczema/dermatitis that mainly affects the sebaceous, gland-rich regions of the scalp, face, and trunk  There are infantile and adult forms of seborrhoeic dermatitis  It is sometimes associated with psoriasis and, in that clinical scenario, may be referred to as "sebo-psoriasis "  Seborrheic dermatitis is also known as "seborrheic eczema "  Dandruff (also called "pityriasis capitis") is an uninflamed form of seborrhoeic dermatitis  Dandruff presents as bran-like scaly patches scattered within hair-bearing areas of the scalp  In an infant, this condition may be referred to as "cradle cap "  The cause of seborrheic dermatitis is not completely understood  It is associated with proliferation of various species of the skin commensal Malassezia, in its yeast (non-pathogenic) form  Its metabolites (such as the fatty acids oleic acid, malssezin, and indole-3-carbaldehyde) may cause an inflammatory reaction  Differences in skin barrier lipid content and function may account for individual presentations  Infantile Seborrheic Dermatitis  Infantile seborrheic dermatitis affects babies under the age of 1 months and usually resolves by 1012 months of age  Infantile seborrheic dermatitis causes "cradle cap" (diffuse, greasy scaling on scalp)  The rash may spread to affect armpit and groin folds (a type of "napkin dermatitis")  There may be associated salmon-pink colored patches that may flake or peel    The rash in this case is usually not especially itchy, so the baby often appears undisturbed by the rash, even when more generalized  Adult Seborrheic Dermatitis  Adult seborrheic dermatitis tends to begin in late adolescence; prevalence is greatest in young adults and in the elderly  It is more common in males than in females  The following factors are sometimes associated with severe adult seborrheic dermatitis:  • Oily skin  • Familial tendency to seborrhoeic dermatitis or a family history of psoriasis  • Immunosuppression: organ transplant recipient, human immunodeficiency virus (HIV) infection and patients with lymphoma  • Neurological and psychiatric diseases: Parkinson disease, tardive dyskinesia, depression, epilepsy, facial nerve palsy, spinal cord injury and congenital disorders such as Down syndrome  • Treatment for psoriasis with psoralen and ultraviolet A (PUVA) therapy  • Lack of sleep  • Stressful events  In adults, seborrheic dermatitis may typically affect the scalp, face (creases around the nose, behind ears, within eyebrows) and upper trunk  Typical clinical features include:  • Winter flares, improving in summer following sun exposure  • Minimal itch most of the time  • Combination oily and dry mid-facial skin  • Ill-defined localized scaly patches or diffuse scale in the scalp  • Blepharitis; scaly red eyelid margins  • [place]-pink, thin, scaly, and ill-defined plaques in skin folds on both sides of the face  • Petal or ring-shaped flaky patches on hair-line and on anterior chest  • Rash in armpits, under the breasts, in the groin folds and genital creases  • Superficial folliculitis (inflamed hair follicles) on cheeks and upper trunk    Seborrheic dermatitis is diagnosed by its clinical appearance and behavior  Skin biopsy may be helpful but is rarely necessary to make this diagnosis    Scribe Attestation    I,:  Renuka Olguin am acting as a scribe while in the presence of the attending physician :       I,:  Leo Arvizu MD personally performed the services described in this documentation    as scribed in my presence :

## 2022-12-08 ENCOUNTER — APPOINTMENT (OUTPATIENT)
Dept: LAB | Facility: CLINIC | Age: 42
End: 2022-12-08

## 2022-12-20 DIAGNOSIS — L21.9 SEBORRHEIC DERMATITIS: Primary | ICD-10-CM

## 2022-12-20 RX ORDER — KETOCONAZOLE 20 MG/ML
SHAMPOO TOPICAL
Qty: 120 ML | Refills: 3 | Status: SHIPPED | OUTPATIENT
Start: 2022-12-20

## 2023-01-27 ENCOUNTER — TELEPHONE (OUTPATIENT)
Dept: PSYCHIATRY | Facility: CLINIC | Age: 43
End: 2023-01-27

## 2023-02-13 ENCOUNTER — OFFICE VISIT (OUTPATIENT)
Dept: NEUROLOGY | Facility: CLINIC | Age: 43
End: 2023-02-13

## 2023-02-13 VITALS
HEIGHT: 67 IN | SYSTOLIC BLOOD PRESSURE: 112 MMHG | BODY MASS INDEX: 35.79 KG/M2 | HEART RATE: 64 BPM | DIASTOLIC BLOOD PRESSURE: 70 MMHG | WEIGHT: 228 LBS

## 2023-02-13 DIAGNOSIS — M54.2 CERVICALGIA: ICD-10-CM

## 2023-02-13 DIAGNOSIS — R20.2 PARESTHESIA OF BOTH HANDS: Primary | ICD-10-CM

## 2023-02-13 RX ORDER — TOPIRAMATE 25 MG/1
25 TABLET ORAL 2 TIMES DAILY
COMMUNITY
Start: 2023-01-04

## 2023-02-13 NOTE — PROGRESS NOTES
Zahraa Stockton is a 38 y o  female presents today with symptoms of numbness and tingling in her hands    Assessment:  1  Paresthesia of both hands    2  Cervicalgia        Plan:  Physical therapy follow-up 3 months    Discussion:  Josselny Hardin reports 1 month history of numbness and tingling in her hands as well as neck pain radiating to the right arm  No localizing abnormalities are noted on her exam results of an MRI performed a month ago were normal and she also had an EMG of the upper extremities which reportedly was normal   She was started on Topamax but her symptoms of numbness and tingling preceded the medication  She is scheduled to initiate physical therapy for her neck  I will see her back in follow-up in a couple of months      Subjective:    HPI  Josselyn Hardin returns in follow-up today with new problems  She states that for the last months she has had numbness and tingling in her hands  She states it is intermittent and does not seem to be related to position or activity  She states she was told she had some weakness in  strength on the right  She also reports symptoms of neck pain radiating into her right shoulder and occasionally into the right arm  She had an MRI done of her cervical spine a month ago which by report was normal   She also recently had an EMG study done of her upper extremities and this reportedly was normal as well  She was started on Topamax about 2 weeks ago for migraine headaches but her numbness preceded this  She notes no numbness or tingling in other areas  Past Medical History:   Diagnosis Date   • Brain damage 2022   • Hypertension    • PTSD (post-traumatic stress disorder) 2022       Family History:  Family History   Problem Relation Age of Onset   • Hypertension Mother        Past Surgical History:  Past Surgical History:   Procedure Laterality Date   •  SECTION     • GASTRIC BYPASS         Social History:   reports that she has quit smoking  She has never used smokeless tobacco  She reports current alcohol use  Allergies:  Amoxicillin-pot clavulanate, Hydromorphone, Zolpidem, Lisinopril, and Whey - food allergy      Current Outpatient Medications:   •  albuterol (PROVENTIL HFA,VENTOLIN HFA) 90 mcg/act inhaler, Inhale 2 puffs every 6 (six) hours as needed, Disp: , Rfl:   •  ALPRAZolam (XANAX) 0 25 mg tablet, Take 1 tablet by mouth 2 (two) times a day as needed, Disp: , Rfl:   •  atorvastatin (LIPITOR) 20 mg tablet, Take 20 mg by mouth daily, Disp: , Rfl:   •  desvenlafaxine (PRISTIQ) 100 mg 24 hr tablet, , Disp: , Rfl:   •  desvenlafaxine succinate (PRISTIQ) 50 mg 24 hr tablet, Take 50 mg by mouth every morning, Disp: , Rfl:   •  diazepam (VALIUM) 2 mg tablet, Take 1 tablet by mouth daily as needed, Disp: , Rfl:   •  diclofenac (VOLTAREN) 75 mg EC tablet, Take 75 mg by mouth 2 (two) times a day with meals, Disp: , Rfl:   •  docusate sodium (COLACE) 100 mg capsule, Take 100 mg by mouth 2 (two) times a day, Disp: , Rfl:   •  ferrous sulfate 325 (65 Fe) mg tablet, Take 325 mg by mouth daily with breakfast, Disp: , Rfl:   •  hydrochlorothiazide (HYDRODIURIL) 25 mg tablet, Take 25 mg by mouth daily, Disp: , Rfl:   •  ibuprofen (MOTRIN) 800 mg tablet, take 1 tablet by mouth twice a day if needed for mild pain, Disp: , Rfl:   •  Iron-Vitamin C 100-250 MG TABS, , Disp: , Rfl:   •  ketoconazole (NIZORAL) 2 % shampoo, Daily for 2 weeks straight and then on "Mondays, Wednesdays and Fridays":  Lather into scalp and skin on face, neck, chest, and back; leave on for 5 minutes and then rinse off completely  , Disp: 120 mL, Rfl: 3  •  losartan (COZAAR) 100 MG tablet, Take 100 mg by mouth daily, Disp: , Rfl:   •  metFORMIN (GLUCOPHAGE) 500 mg tablet, Take 1,000 mg by mouth 2 (two) times a day with meals, Disp: , Rfl:   •  methocarbamol (ROBAXIN) 750 mg tablet, Take 750 mg by mouth every 6 (six) hours as needed, Disp: , Rfl:   •  Multiple Vitamins-Minerals (MULTIVITAMIN GUMMIES ADULT PO), , Disp: , Rfl:   •  topiramate (TOPAMAX) 25 mg tablet, Take 25 mg by mouth 2 (two) times a day, Disp: , Rfl:   •  busPIRone (BUSPAR) 10 mg tablet, take 1 tablet by mouth twice a day MORNING/AFTERNOON (Patient not taking: Reported on 2/13/2023), Disp: , Rfl:   •  meloxicam (MOBIC) 15 mg tablet, Take 15 mg by mouth daily (Patient not taking: Reported on 2/13/2023), Disp: , Rfl:   •  methylphenidate (RITALIN) 5 mg tablet, Take 5 mg by mouth 2 (two) times a day (Patient not taking: Reported on 2/13/2023), Disp: , Rfl:   •  sertraline (ZOLOFT) 100 mg tablet, Take 100 mg by mouth in the morning  (Patient not taking: Reported on 11/22/2022), Disp: , Rfl:   •  sertraline (ZOLOFT) 50 mg tablet, Take 50 mg by mouth every evening (Patient not taking: Reported on 11/22/2022), Disp: , Rfl:     I have reviewed the past medical, social and family history, current medications, allergies, vitals, review of systems and updated this information as appropriate today     Objective:    Vitals:  Blood pressure 112/70, pulse 64, height 5' 7" (1 702 m), weight 103 kg (228 lb)  Physical Exam    Neurological Exam  GENERAL: Well-developed well-nourished woman in no acute distress  HEENT/NECK: Head is atraumatic normocephalic, neck is supple with good range of motion  CARDIOVASCULAR: No carotid bruit  NEUROLOGIC:  Mental Status: Awake and alert without aphasia  Cranial Nerves: Extraocular movements are full  Visual fields are full  Pupils are 3 mm and reactive  Face is symmetrical to light touch and movements  Soft palate symmetrically tongue is midline  Motor: No drift is noted on arm extension  No focal weakness is noted to motor testing in the distal and proximal upper and lower extremities bilaterally  Bulk and tone are normal   Sensory: Intact to temperature and vibratory sensation in the extremities bilaterally  Cortical functions intact  Coordination: Finger-to-nose testing is performed active    Romberg is negative  Gait is stable  She is able to tandem walk  Reflexes: 1/4 in the biceps triceps brachialis regions trace at the knees and 1 at the ankles  Toes are downgoing  Tinel's is negative over the carpal tunnel region bilaterally      ROS:    Review of Systems   Constitutional: Negative  Negative for appetite change and fever  HENT: Negative  Negative for hearing loss, tinnitus, trouble swallowing and voice change  Eyes: Positive for photophobia  Negative for pain and visual disturbance  Respiratory: Negative  Negative for shortness of breath  Cardiovascular: Negative  Negative for palpitations  Gastrointestinal: Negative  Negative for nausea and vomiting  Endocrine: Negative  Negative for cold intolerance  Genitourinary: Negative  Negative for dysuria, frequency and urgency  Musculoskeletal: Negative  Negative for gait problem, myalgias and neck pain  Skin: Negative  Negative for rash  Allergic/Immunologic: Negative  Neurological: Positive for dizziness, weakness (R side), light-headedness, numbness (R side and both hands) and headaches (decrease in headaches since initiated topamax)  Negative for tremors, seizures, syncope, facial asymmetry and speech difficulty  Hematological: Negative  Does not bruise/bleed easily  Psychiatric/Behavioral: Negative  Negative for confusion, hallucinations and sleep disturbance

## 2023-02-21 ENCOUNTER — TELEPHONE (OUTPATIENT)
Dept: NEUROLOGY | Facility: CLINIC | Age: 43
End: 2023-02-21

## 2023-05-04 ENCOUNTER — TELEPHONE (OUTPATIENT)
Dept: NEUROLOGY | Facility: CLINIC | Age: 43
End: 2023-05-04

## 2023-05-24 ENCOUNTER — APPOINTMENT (OUTPATIENT)
Dept: LAB | Facility: CLINIC | Age: 43
End: 2023-05-24

## 2023-05-24 DIAGNOSIS — E11.9 TYPE 2 DIABETES MELLITUS WITHOUT COMPLICATION, WITHOUT LONG-TERM CURRENT USE OF INSULIN (HCC): ICD-10-CM

## 2023-05-24 LAB
ALBUMIN SERPL BCP-MCNC: 3.8 G/DL (ref 3.5–5)
ALP SERPL-CCNC: 64 U/L (ref 46–116)
ALT SERPL W P-5'-P-CCNC: 27 U/L (ref 12–78)
ANION GAP SERPL CALCULATED.3IONS-SCNC: 3 MMOL/L (ref 4–13)
AST SERPL W P-5'-P-CCNC: 18 U/L (ref 5–45)
BILIRUB SERPL-MCNC: 0.24 MG/DL (ref 0.2–1)
BUN SERPL-MCNC: 15 MG/DL (ref 5–25)
CALCIUM SERPL-MCNC: 9 MG/DL (ref 8.3–10.1)
CHLORIDE SERPL-SCNC: 109 MMOL/L (ref 96–108)
CHOLEST SERPL-MCNC: 140 MG/DL
CO2 SERPL-SCNC: 25 MMOL/L (ref 21–32)
CREAT SERPL-MCNC: 0.64 MG/DL (ref 0.6–1.3)
CREAT UR-MCNC: 157 MG/DL
EST. AVERAGE GLUCOSE BLD GHB EST-MCNC: 148 MG/DL
GFR SERPL CREATININE-BSD FRML MDRD: 108 ML/MIN/1.73SQ M
GLUCOSE P FAST SERPL-MCNC: 111 MG/DL (ref 65–99)
HBA1C MFR BLD: 6.8 %
HDLC SERPL-MCNC: 45 MG/DL
LDLC SERPL CALC-MCNC: 69 MG/DL (ref 0–100)
MICROALBUMIN UR-MCNC: 11.7 MG/L (ref 0–20)
MICROALBUMIN/CREAT 24H UR: 7 MG/G CREATININE (ref 0–30)
NONHDLC SERPL-MCNC: 95 MG/DL
POTASSIUM SERPL-SCNC: 3.4 MMOL/L (ref 3.5–5.3)
PROT SERPL-MCNC: 7.6 G/DL (ref 6.4–8.4)
SODIUM SERPL-SCNC: 137 MMOL/L (ref 135–147)
TRIGL SERPL-MCNC: 129 MG/DL

## 2023-09-05 ENCOUNTER — TELEPHONE (OUTPATIENT)
Dept: NEUROLOGY | Facility: CLINIC | Age: 43
End: 2023-09-05

## 2023-09-05 NOTE — TELEPHONE ENCOUNTER
Called and spoke to patient regarding R/S appt to a sooner date.  Due to last minute cancellations for 09/06, offered pt slot for 09/06 @10:40AM. Removed from wait list.

## 2023-09-06 ENCOUNTER — OFFICE VISIT (OUTPATIENT)
Dept: NEUROLOGY | Facility: CLINIC | Age: 43
End: 2023-09-06
Payer: COMMERCIAL

## 2023-09-06 VITALS — DIASTOLIC BLOOD PRESSURE: 70 MMHG | SYSTOLIC BLOOD PRESSURE: 110 MMHG | HEART RATE: 74 BPM

## 2023-09-06 DIAGNOSIS — R20.2 PARESTHESIA OF BOTH HANDS: ICD-10-CM

## 2023-09-06 DIAGNOSIS — M54.2 CERVICALGIA: Primary | ICD-10-CM

## 2023-09-06 PROCEDURE — 99213 OFFICE O/P EST LOW 20 MIN: CPT | Performed by: PSYCHIATRY & NEUROLOGY

## 2023-09-06 RX ORDER — TRAMADOL HYDROCHLORIDE 50 MG/1
50 TABLET ORAL EVERY 8 HOURS PRN
COMMUNITY
Start: 2023-08-29 | End: 2023-09-13

## 2023-09-06 RX ORDER — ERGOCALCIFEROL 1.25 MG/1
1 CAPSULE ORAL WEEKLY
COMMUNITY
Start: 2023-07-03 | End: 2023-09-25

## 2023-09-06 RX ORDER — CYCLOBENZAPRINE HCL 10 MG
10 TABLET ORAL
COMMUNITY
Start: 2023-08-29 | End: 2023-10-28

## 2023-09-06 NOTE — PROGRESS NOTES
Marcella Mattue is a 38 y.o. female returns [facility] with history of neck pain and hand paresthesia    Assessment:  1. Cervicalgia    2. Paresthesia of both hands        Plan:  Follow-up 6 months    Discussion:  Denae Herron continues to report neck pain symptoms and is in physical therapy. Cervical MRI was unremarkable. If symptoms persist agree with PCP that she may need pain management. She reports the numbness in her hands are resolved. She is currently working with the concussion team at [facility] as she reports an iron fell on her head out of a closet in April and is having persistent symptoms. Continue with this and I will see her back in follow-up in 6 months      Subjective:    STEVE Herron returns in follow-up today. She reports that since her last and iron fell on her head. She states she was diagnosed with a concussion and is currently working with [facility] concussion team.  She states she recent completed vestibular therapy. She is in physical therapy for her neck. She states she continues to have symptoms of neck pain as well as headaches that radiate from her neck up into her head. She had a cervical MRI that was unremarkable. She states she is currently in physical therapy and if she does not improve her PCP is recommended pain management. Past Medical History:   Diagnosis Date   • Brain damage 2022   • Hypertension    • PTSD (post-traumatic stress disorder) 2022   • Syncope        Family History:  Family History   Problem Relation Age of Onset   • Hypertension Mother        Past Surgical History:  Past Surgical History:   Procedure Laterality Date   •  SECTION     • GASTRIC BYPASS         Social History:   reports that she has quit smoking. She has never used smokeless tobacco. She reports current alcohol use.     Allergies:  Amoxicillin-pot clavulanate, Hydromorphone, Zolpidem, Lactose - food allergy, Lisinopril, and Whey - food allergy      Current Outpatient Medications:   •  ALPRAZolam (XANAX) 0.25 mg tablet, Take 1 tablet by mouth 2 (two) times a day as needed, Disp: , Rfl:   •  Blood Glucose Monitoring Suppl (ONE TOUCH ULTRA 2) w/Device KIT, 2 (two) times a day Use as directed, Disp: , Rfl:   •  cyclobenzaprine (FLEXERIL) 10 mg tablet, Take 10 mg by mouth, Disp: , Rfl:   •  desvenlafaxine (PRISTIQ) 100 mg 24 hr tablet, , Disp: , Rfl:   •  desvenlafaxine succinate (PRISTIQ) 50 mg 24 hr tablet, Take 50 mg by mouth every morning, Disp: , Rfl:   •  docusate sodium (COLACE) 100 mg capsule, Take 100 mg by mouth 2 (two) times a day, Disp: , Rfl:   •  ergocalciferol (ERGOCALCIFEROL) 1.25 MG (89371 UT) capsule, Take 1 capsule by mouth once a week, Disp: , Rfl:   •  ferrous sulfate 325 (65 Fe) mg tablet, Take 325 mg by mouth daily with breakfast, Disp: , Rfl:   •  hydrochlorothiazide (HYDRODIURIL) 25 mg tablet, Take 25 mg by mouth daily, Disp: , Rfl:   •  ibuprofen (MOTRIN) 800 mg tablet, take 1 tablet by mouth twice a day if needed for mild pain, Disp: , Rfl:   •  Iron-Vitamin C 100-250 MG TABS, , Disp: , Rfl:   •  Lancets (OneTouch Delica Plus [id number]) MISC, use 1 LANCET to TEST BLOOD SUGAR twice a day, Disp: , Rfl:   •  metFORMIN (GLUCOPHAGE) 500 mg tablet, Take 1,000 mg by mouth 2 (two) times a day with meals, Disp: , Rfl:   •  Multiple Vitamins-Minerals (MULTIVITAMIN GUMMIES ADULT PO), , Disp: , Rfl:   •  OneTouch Ultra test strip, use 1 TEST STRIP to TEST BLOOD SUGAR twice a day, Disp: , Rfl:   •  topiramate (TOPAMAX) 25 mg tablet, Take 25 mg by mouth 2 (two) times a day, Disp: , Rfl:   •  traMADol (ULTRAM) 50 mg tablet, Take 50 mg by mouth every 8 (eight) hours as needed, Disp: , Rfl:   •  atorvastatin (LIPITOR) 20 mg tablet, Take 20 mg by mouth daily, Disp: , Rfl:   •  busPIRone (BUSPAR) 10 mg tablet, take 1 tablet by mouth twice a day MORNING/AFTERNOON (Patient not taking: Reported on 2/13/2023), Disp: , Rfl:   •  diazepam (VALIUM) 2 mg tablet, Take 1 tablet by mouth daily as needed (Patient not taking: Reported on 9/6/2023), Disp: , Rfl:   •  diclofenac (VOLTAREN) 75 mg EC tablet, Take 75 mg by mouth 2 (two) times a day with meals (Patient not taking: Reported on 9/6/2023), Disp: , Rfl:   •  ketoconazole (NIZORAL) 2 % shampoo, Daily for 2 weeks straight and then on "Mondays, Wednesdays and Fridays":  Lather into scalp and skin on face, neck, chest, and back; leave on for 5 minutes and then rinse off completely. (Patient not taking: Reported on 9/6/2023), Disp: 120 mL, Rfl: 3  •  losartan (COZAAR) 100 MG tablet, Take 100 mg by mouth daily, Disp: , Rfl:   •  meloxicam (MOBIC) 15 mg tablet, Take 15 mg by mouth daily (Patient not taking: Reported on 2/13/2023), Disp: , Rfl:   •  methocarbamol (ROBAXIN) 750 mg tablet, Take 750 mg by mouth every 6 (six) hours as needed (Patient not taking: Reported on 9/6/2023), Disp: , Rfl:   •  methylphenidate (RITALIN) 5 mg tablet, Take 5 mg by mouth 2 (two) times a day (Patient not taking: Reported on 2/13/2023), Disp: , Rfl:   •  sertraline (ZOLOFT) 100 mg tablet, Take 100 mg by mouth in the morning. (Patient not taking: Reported on 11/22/2022), Disp: , Rfl:   •  sertraline (ZOLOFT) 50 mg tablet, Take 50 mg by mouth every evening (Patient not taking: Reported on 11/22/2022), Disp: , Rfl:     I have reviewed the past medical, social and family history, current medications, allergies, vitals, review of systems and updated this information as appropriate today     Objective:    Vitals:  Blood pressure 110/70, pulse 74. Physical Exam    Neurological Exam  GENERAL: Well-developed well-nourished woman in no acute distress  HEENT/NECK: Head is atraumatic normocephalic, neck is supple  NEUROLOGIC:  Mental Status: Awake and alert without aphasia  Cranial Nerves: Extraocular movements are full. Face is symmetrical  Coordination: Gait is stable      ROS:    Review of Systems   Constitutional: Negative for appetite change, fatigue and fever. HENT: Negative. Negative for hearing loss, tinnitus, trouble swallowing and voice change. Eyes: Negative. Negative for photophobia, pain and visual disturbance. Respiratory: Negative. Negative for shortness of breath. Cardiovascular: Negative. Negative for palpitations. Gastrointestinal: Negative. Negative for nausea and vomiting. Endocrine: Negative. Negative for cold intolerance. Genitourinary: Negative. Negative for dysuria, frequency and urgency. Musculoskeletal: Negative for back pain, gait problem, myalgias and neck pain. Skin: Negative. Negative for rash. Allergic/Immunologic: Negative. Neurological: Negative. Negative for dizziness, tremors, seizures, syncope, facial asymmetry, speech difficulty, weakness, light-headedness, numbness and headaches. Hematological: Negative. Does not bruise/bleed easily. Psychiatric/Behavioral: Positive for confusion. Negative for hallucinations and sleep disturbance.

## 2024-03-18 ENCOUNTER — APPOINTMENT (OUTPATIENT)
Age: 44
End: 2024-03-18
Payer: COMMERCIAL

## 2024-03-18 DIAGNOSIS — R52 PAIN: ICD-10-CM

## 2024-03-18 PROCEDURE — 73630 X-RAY EXAM OF FOOT: CPT

## 2024-03-28 ENCOUNTER — OFFICE VISIT (OUTPATIENT)
Age: 44
End: 2024-03-28
Payer: COMMERCIAL

## 2024-03-28 VITALS — TEMPERATURE: 98 F | WEIGHT: 203 LBS | BODY MASS INDEX: 31.79 KG/M2

## 2024-03-28 DIAGNOSIS — L64.9 ANDROGENETIC ALOPECIA: Primary | ICD-10-CM

## 2024-03-28 PROCEDURE — 99214 OFFICE O/P EST MOD 30 MIN: CPT | Performed by: DERMATOLOGY

## 2024-03-28 NOTE — PATIENT INSTRUCTIONS
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 Patient  hasn't discuss spironolactone with Doctor Lee     Assessment and Plan:  Based on a thorough discussion of this condition and the management approach to it (including a comprehensive discussion of the known risks, side effects and potential benefits of treatment), the patient (family) agrees to implement the following specific plan:   Continue same therapy and patient advise only going to help when she uses the medication and will be reversible if she stop.   Discuss to use spironolactone

## 2024-03-28 NOTE — PROGRESS NOTES
"[facility] Note     Patient Name: Diane March  Encounter Date: 03/28/2024     Have you been cared for by a [facility] Dermatologist in the last 3 years and, if so, which description applies to you?    Yes.  I have been here within the last 3 years, and my medical history has NOT changed since that time.  I am FEMALE/of child-bearing potential.    REVIEW OF SYSTEMS:  Have you recently had or currently have any of the following? No changes in my recent health.   PAST MEDICAL HISTORY:  Have you personally ever had or currently have any of the following?  If \"YES,\" then please provide more detail. No changes in my medical history.   HISTORY OF IMMUNOSUPPRESSION: Do you have a history of any of the following:  Systemic Immunosuppression such as Diabetes, Biologic or Immunotherapy, Chemotherapy, Organ Transplantation, Bone Marrow Transplantation?  No     Answering \"YES\" requires the addition of the dotphrase \"IMMUNOSUPPRESSED\" as the first diagnosis of the patient's visit.   FAMILY HISTORY:  Any \"first degree relatives\" (parent, brother, sister, or child) with the following?    No changes in my family's known health.   PATIENT EXPERIENCE:    Do you want the Dermatologist to perform a COMPLETE skin exam today including a clinical examination under the \"bra and underwear\" areas?  Yes  If necessary, do we have your permission to call and leave a detailed message on your Preferred Phone number that includes your specific medical information?  Yes      Allergies   Allergen Reactions    Amoxicillin-Pot Clavulanate Itching    Hydromorphone Abdominal Pain     Red rash ,itchy    Zolpidem Itching    Lactose - Food Allergy Other (See Comments)    Lisinopril Itching    Whey - Food Allergy Hives and Rash      Current Outpatient Medications:     ALPRAZolam (XANAX) 0.25 mg tablet, Take 1 tablet by mouth 2 (two) times a day as needed, Disp: , Rfl:     Blood Glucose Monitoring Suppl (ONE TOUCH ULTRA 2) w/Device " "KIT, 2 (two) times a day Use as directed, Disp: , Rfl:     busPIRone (BUSPAR) 10 mg tablet, , Disp: , Rfl:     desvenlafaxine (PRISTIQ) 100 mg 24 hr tablet, , Disp: , Rfl:     desvenlafaxine succinate (PRISTIQ) 50 mg 24 hr tablet, Take 50 mg by mouth every morning, Disp: , Rfl:     diazepam (VALIUM) 2 mg tablet, Take 1 tablet by mouth daily as needed, Disp: , Rfl:     diclofenac (VOLTAREN) 75 mg EC tablet, Take 75 mg by mouth 2 (two) times a day with meals, Disp: , Rfl:     docusate sodium (COLACE) 100 mg capsule, Take 100 mg by mouth 2 (two) times a day, Disp: , Rfl:     ferrous sulfate 325 (65 Fe) mg tablet, Take 325 mg by mouth daily with breakfast, Disp: , Rfl:     ibuprofen (MOTRIN) 800 mg tablet, take 1 tablet by mouth twice a day if needed for mild pain, Disp: , Rfl:     Iron-Vitamin C 100-250 MG TABS, , Disp: , Rfl:     ketoconazole (NIZORAL) 2 % shampoo, Daily for 2 weeks straight and then on \"Mondays, Wednesdays and Fridays\":  Lather into scalp and skin on face, neck, chest, and back; leave on for 5 minutes and then rinse off completely., Disp: 120 mL, Rfl: 3    Lancets (OneTouch Delica Plus [id number]) MISC, use 1 LANCET to TEST BLOOD SUGAR twice a day, Disp: , Rfl:     metFORMIN (GLUCOPHAGE) 500 mg tablet, Take 1,000 mg by mouth 2 (two) times a day with meals, Disp: , Rfl:     methocarbamol (ROBAXIN) 750 mg tablet, Take 750 mg by mouth every 6 (six) hours as needed, Disp: , Rfl:     methylphenidate (RITALIN) 5 mg tablet, Take 5 mg by mouth 2 (two) times a day, Disp: , Rfl:     Multiple Vitamins-Minerals (MULTIVITAMIN GUMMIES ADULT PO), , Disp: , Rfl:     OneTouch Ultra test strip, use 1 TEST STRIP to TEST BLOOD SUGAR twice a day, Disp: , Rfl:     sertraline (ZOLOFT) 100 mg tablet, Take 100 mg by mouth daily, Disp: , Rfl:     sertraline (ZOLOFT) 50 mg tablet, Take 50 mg by mouth every evening, Disp: , Rfl:     topiramate (TOPAMAX) 25 mg tablet, Take 25 mg by mouth 2 (two) times a day, Disp: , Rfl:     " atorvastatin (LIPITOR) 20 mg tablet, Take 20 mg by mouth daily, Disp: , Rfl:     cyclobenzaprine (FLEXERIL) 10 mg tablet, Take 10 mg by mouth, Disp: , Rfl:     ergocalciferol (ERGOCALCIFEROL) 1.25 MG (50458 UT) capsule, Take 1 capsule by mouth once a week, Disp: , Rfl:     hydrochlorothiazide (HYDRODIURIL) 25 mg tablet, Take 25 mg by mouth daily, Disp: , Rfl:     losartan (COZAAR) 100 MG tablet, Take 100 mg by mouth daily, Disp: , Rfl:     meloxicam (MOBIC) 15 mg tablet, Take 15 mg by mouth daily (Patient not taking: Reported on 2/13/2023), Disp: , Rfl:           Whom besides the patient is providing clinical information about today's encounter?   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 Patient  hasn't discuss spironolactone with Doctor Lee     Assessment and Plan:  Based on a thorough discussion of this condition and the management approach to it (including a comprehensive discussion of the known risks, side effects and potential benefits of treatment), the patient (family) agrees to implement the following specific plan:   Continue same therapy and patient advise only going to help when she uses the medication and will be reversible if she stop.   Discuss possible use  of spironolactone would discuss use instead of hydrochlorothiazide      Scribe Attestation      I,:  Kasia Hopkins am acting as a scribe while in the presence of the attending physician.:       I,:  Rene Ramos,  MD personally performed the services described in this documentation    as scribed in my presence.: